# Patient Record
Sex: FEMALE | Race: OTHER | NOT HISPANIC OR LATINO | URBAN - METROPOLITAN AREA
[De-identification: names, ages, dates, MRNs, and addresses within clinical notes are randomized per-mention and may not be internally consistent; named-entity substitution may affect disease eponyms.]

---

## 2020-07-08 NOTE — PATIENT DISCUSSION
New Prescription: ciprofloxacin (ciprofloxacin): drops: 0.3% 1 drop three times a day as directed into right eye 07-

## 2020-07-08 NOTE — PATIENT DISCUSSION
- No traumatic iritis or hyphema, no corneal abrasion, no obvious retina pathology (undilated but large pupil)

## 2022-06-24 ENCOUNTER — NEW PATIENT (OUTPATIENT)
Dept: URBAN - METROPOLITAN AREA CLINIC 52 | Facility: CLINIC | Age: 62
End: 2022-06-24

## 2022-06-24 DIAGNOSIS — H43.812: ICD-10-CM

## 2022-06-24 DIAGNOSIS — H25.13: ICD-10-CM

## 2022-06-24 DIAGNOSIS — H02.831: ICD-10-CM

## 2022-06-24 DIAGNOSIS — E11.9: ICD-10-CM

## 2022-06-24 DIAGNOSIS — H43.391: ICD-10-CM

## 2022-06-24 DIAGNOSIS — H02.834: ICD-10-CM

## 2022-06-24 DIAGNOSIS — H52.13: ICD-10-CM

## 2022-06-24 PROCEDURE — 92015 DETERMINE REFRACTIVE STATE: CPT

## 2022-06-24 PROCEDURE — 92004 COMPRE OPH EXAM NEW PT 1/>: CPT

## 2022-06-24 ASSESSMENT — VISUAL ACUITY
OD_CC: 20/25
OU_CC: J1
OS_GLARE: 20/20-2
OS_CC: 20/20-2
OD_GLARE: 20/25

## 2022-06-24 ASSESSMENT — TONOMETRY
OD_IOP_MMHG: 15
OS_IOP_MMHG: 15

## 2024-09-09 ENCOUNTER — COMPREHENSIVE EXAM (OUTPATIENT)
Dept: URBAN - METROPOLITAN AREA CLINIC 52 | Facility: CLINIC | Age: 64
End: 2024-09-09

## 2024-09-09 DIAGNOSIS — H43.812: ICD-10-CM

## 2024-09-09 DIAGNOSIS — H52.13: ICD-10-CM

## 2024-09-09 DIAGNOSIS — E11.9: ICD-10-CM

## 2024-09-09 DIAGNOSIS — H02.834: ICD-10-CM

## 2024-09-09 DIAGNOSIS — H02.831: ICD-10-CM

## 2024-09-09 DIAGNOSIS — H25.13: ICD-10-CM

## 2024-09-09 DIAGNOSIS — H43.393: ICD-10-CM

## 2024-09-09 PROCEDURE — 92015 DETERMINE REFRACTIVE STATE: CPT

## 2024-09-09 PROCEDURE — 99214 OFFICE O/P EST MOD 30 MIN: CPT
